# Patient Record
Sex: MALE | Race: WHITE
[De-identification: names, ages, dates, MRNs, and addresses within clinical notes are randomized per-mention and may not be internally consistent; named-entity substitution may affect disease eponyms.]

---

## 2017-12-23 ENCOUNTER — HOSPITAL ENCOUNTER (EMERGENCY)
Dept: HOSPITAL 25 - UCEAST | Age: 52
Discharge: HOME | End: 2017-12-23
Payer: COMMERCIAL

## 2017-12-23 VITALS — DIASTOLIC BLOOD PRESSURE: 82 MMHG | SYSTOLIC BLOOD PRESSURE: 122 MMHG

## 2017-12-23 DIAGNOSIS — L03.032: Primary | ICD-10-CM

## 2017-12-23 DIAGNOSIS — Z88.0: ICD-10-CM

## 2017-12-23 DIAGNOSIS — Z87.891: ICD-10-CM

## 2017-12-23 PROCEDURE — 99212 OFFICE O/P EST SF 10 MIN: CPT

## 2017-12-23 PROCEDURE — G0463 HOSPITAL OUTPT CLINIC VISIT: HCPCS

## 2017-12-23 NOTE — UC
Lower Extremity/Ankle HPI





- HPI Summary


HPI Summary: 





PT HAS HAD TROUBLE WITH LEFT GREAT TOE NAIL FOR ABOUT A YEAR SINCE STUBBING IT 

BADLY AND CRACKING THE NAIL. NORMALLY IT DOES NOT BOTHER HIM BUT LAST NIGHT 

DEVELOPED DULL ACHING PAIN AND PAIN WITH WEIGHT BEARING. SKIN IS RED AND TOE IS 

TENDER. NO FEVER.





- History of Current Complaint


Chief Complaint: UCLowerExtremity


Stated Complaint: TOE PAIN


Time Seen by Provider: 12/23/17 12:00


Hx Obtained From: Patient


Onset/Duration: Sudden Onset, Lasting Hours, Still Present


Severity Initially: Mild


Severity Currently: Mild


Pain Intensity: 3


Pain Scale Used: 0-10 Numeric


Aggravating Factor(s): Standing, Ambulation


Alleviating Factor(s): Rest


Able to Bear Weight: Yes





- Allergies/Home Medications


Allergies/Adverse Reactions: 


 Allergies











Allergy/AdvReac Type Severity Reaction Status Date / Time


 


Penicillins Allergy  Unknown Verified 12/23/17 10:29





   Reaction  





   Details  














PMH/Surg Hx/FS Hx/Imm Hx


Previously Healthy: Yes





- Surgical History


Surgical History: Yes


Surgery Procedure, Year, and Place: Hernia repair 2012, Appendectomy 1980





- Family History


Known Family History: Positive: Cardiac Disease, Hypertension, Other - stroke





- Social History


Alcohol Use: Rare


Alcohol Amount: 1-2 beers per week


Substance Use Type: None


Smoking Status (MU): Former Smoker


Amount Used/How Often: socially


Have You Smoked in the Last Year: No


When Did the Patient Quit Smoking/Using Tobacco: 20 years ago





Review of Systems


Constitutional: Negative


Skin: Other - ERYTHEMA LEFT GREAT TOE.


Respiratory: Negative


Cardiovascular: Negative


Gastrointestinal: Negative


All Other Systems Reviewed And Are Negative: Yes





Physical Exam


Triage Information Reviewed: Yes


Appearance: Well-Appearing, No Pain Distress, Well-Nourished


Vital Signs: 


 Initial Vital Signs











Temp  97.4 F   12/23/17 10:26


 


Pulse  85   12/23/17 10:26


 


Resp  17   12/23/17 10:26


 


BP  111/72   12/23/17 10:26


 


Pulse Ox  100   12/23/17 10:26











Vital Signs Reviewed: Yes


Eyes: Positive: Conjunctiva Clear


ENT: Positive: Hearing grossly normal


Neck: Positive: Supple


Respiratory: Positive: No respiratory distress, No accessory muscle use


Cardiovascular: Positive: Pulses Normal


Abdomen Description: Positive: Soft


Musculoskeletal: Positive: No Edema


Neurological: Positive: Alert


Psychological: Positive: Age Appropriate Behavior


Skin: Positive: Other - LEFT GREAT TOE NAIL YELLOWED, THICKENED AND LIFTING OFF 

MEDIALLY. ADJACENT SKIN IS ERYTHEMATOUS AND TENDER. NO DRAINAGE.





Lower Extremity Course/Dx





- Differential Dx/Diagnosis


Provider Diagnoses: LEFT GREAT TOE PARONYCHIA





Discharge





- Discharge Plan


Condition: Stable


Disposition: HOME


Prescriptions: 


Cephalexin CAP* [Keflex 500 CAP*] 1,000 mg PO BID #40 cap


Patient Education Materials:  Paronychia (ED)


Referrals: 


Santa Holt MD [Primary Care Provider] - If Needed


Additional Instructions: 


YOU MAY HAVE A FUNGAL INFECTION OF THE TOENAIL BUT IT IS RED AND TENDER AS WELL 

SO WE WILL COVER YOU FOR INFECTION WITH ANTIBIOTICS. HOT SOAKS SEVERAL TIMES 

DAILY. FOLLOW-UP WITH PODIATRY FOR FURTHER EVALUATION OF POSSIBLE FUNGUS AND 

INGROWN NAIL.








PODIATRY IN [facility]


Dr. Pietro Coates


[address]


[place]


[phone]





Dr. Greyson Jaime.  Please call his office at [phone] to make an appointment 

to be seen





Dr. Baljinder Aranda.  Please call his office at [phone] to make an 

appointment to be seen

## 2019-12-03 ENCOUNTER — HOSPITAL ENCOUNTER (OUTPATIENT)
Dept: HOSPITAL 25 - OR | Age: 54
Discharge: HOME | End: 2019-12-03
Attending: SURGERY
Payer: COMMERCIAL

## 2019-12-03 VITALS — DIASTOLIC BLOOD PRESSURE: 80 MMHG | SYSTOLIC BLOOD PRESSURE: 122 MMHG

## 2019-12-03 DIAGNOSIS — Z88.0: ICD-10-CM

## 2019-12-03 DIAGNOSIS — G47.30: ICD-10-CM

## 2019-12-03 DIAGNOSIS — K40.90: Primary | ICD-10-CM

## 2019-12-03 DIAGNOSIS — Z87.891: ICD-10-CM

## 2019-12-03 PROCEDURE — C1781 MESH (IMPLANTABLE): HCPCS

## 2019-12-03 PROCEDURE — 49650 LAP ING HERNIA REPAIR INIT: CPT

## 2019-12-03 NOTE — OP
CC:  Santa Holt MD *

 

DATE OF OPERATION:  12/03/19 - [facility]

 

DATE OF BIRTH:  [birth year]

 

SURGEON:  Hung Reza MD

 

ASSISTANT:  LEE Scott.

 

ANESTHESIOLOGIST:  Emeka Trent MD

 

ANESTHESIA:  General endotracheal.

 

PRE-OP DIAGNOSIS:  Left inguinal hernia.

 

POST-OP DIAGNOSIS:  Left inguinal hernia.

 

OPERATIVE PROCEDURE:  Robotic left inguinal hernia repair with mesh.

 

ESTIMATED BLOOD LOSS:  Minimal.

 

IV FLUIDS:  1.8 L crystalloid.

 

SPECIMEN:  None.

 

DRAINS:  None.

 

COMPLICATIONS:  None.

 

COUNTS:  Instrument, needle, and sponge counts correct.

 

DESCRIPTION OF PROCEDURE:  The patient was brought to the operating room and 
placed on the table supine.  Sequential compression devices were placed in both 
lower extremities.  General anesthesia was administered.  Received appropriate 
intravenous antibiotics.  He was positioned and padded appropriately.  Then, he 
was prepped and draped in the usual sterile fashion and then time-out was 
performed.

 

Local anesthetic was infiltrated into the skin and soft tissue prior to making 
each incision.  Pneumoperitoneum was established using Veress needle through 
transumbilical approach.  After insufflating carbon dioxide to pressure of 12 
mmHg, an 8-mm optical trocar was used to access the peritoneal cavity through 
left upper quadrant.  Additional trocars were placed in the epigastrium in the 
midline and then in the right upper quadrant.

 

Inspection revealed evidence of prior right inguinal hernia repair.  There was 
an indirect left inguinal hernia.  Adjacent to this was sigmoid colon that 
appeared to be part of the sliding component.

 

A transverse incision was made in the peritoneum about 10 cm above the inguinal 
ligament and entering the preperitoneal space, blunt dissection was used to 
dissect down to the midline at the pubic symphysis.  The prior placed mesh from 
the right side of the repair was encountered.  Inferior epigastric vessels were 
identified and preserved.  Dissection proceeded medial to laterally to dissect 
out the indirect inguinal hernia sac with preservation of the spermatic cord 
structures and dissection proceeded laterally to anterosuperior iliac spine.  
As the hernia sac was reduced, it was noted that the sac was torn and rather 
than trying to dissect out the entire sac, it was simply divided allowing the 
distal portion to track down the inguinal canal.  The proximal defect in the 
peritoneum was run closed with 3-0 V-Loc 90 suture.  After this, the hernia 
repair was performed with 10 x 15 cm piece of Medtronic ProGrip mesh.  This 
positioned to the midline covering the direct, indirect, and femoral spaces.  
After positioning the mesh, the peritoneal flap was closed with a 3-0 V-Loc 90 
suture running medial to lateral.  The peritoneum was verified to be intact and 
then instrumentation was removed and ports removed. Carbon dioxide was 
released.  Skin incisions were closed with 4-0 Monocryl in subcuticular fashion 
and DermaFlex was applied.  The patient tolerated this procedure well and was 
extubated and transferred to Recovery in stable condition.

 

 [id number]/[facility] #: [id number]

AXEL